# Patient Record
Sex: MALE | Race: WHITE
[De-identification: names, ages, dates, MRNs, and addresses within clinical notes are randomized per-mention and may not be internally consistent; named-entity substitution may affect disease eponyms.]

---

## 2019-03-06 ENCOUNTER — HOSPITAL ENCOUNTER (OUTPATIENT)
Dept: HOSPITAL 53 - M SMT | Age: 49
End: 2019-03-06
Attending: INTERNAL MEDICINE

## 2019-03-06 DIAGNOSIS — Z02.89: Primary | ICD-10-CM

## 2019-03-07 NOTE — REP
Clinical:  Pain.

 

Technique:  AP, lateral, bilateral oblique and sunrise views of the right knee.

 

Findings:

Increased sclerosis to the tibial plateau and mild medial joint space narrowing

appreciated along with subtle cortical irregularity at the femoral condyles. No

acute fracture dislocation.  No effusion.

 

Impression:

Mild degenerative changes.

 

 

Electronically Signed by

Rosendo White MD 03/07/2019 02:23 A